# Patient Record
Sex: FEMALE | Race: WHITE | NOT HISPANIC OR LATINO | Employment: UNEMPLOYED | URBAN - METROPOLITAN AREA
[De-identification: names, ages, dates, MRNs, and addresses within clinical notes are randomized per-mention and may not be internally consistent; named-entity substitution may affect disease eponyms.]

---

## 2022-04-26 NOTE — PROGRESS NOTES
"History of Present Illness - Cristina Moya is a very pleasant 56 year old female here to see me for the first time for chronic ear drainage.    She tells me that since childhood she had chronic LEFT ear drainage.  She was followed by ENT in [place], and was treated recurrently with drops and antibiotics.      She eventually moved to [place] later in childhood, and was seen by an ENT there.  She had a few surgeries, and had a perforation.  Eventually she had \"reconstructive ear surgery.\"  THings were fine for many years.  But then about 6 years ago she started noticing that her hearing was getting worse, and eventually was fitted with hearing aids. She was followed long term by Dr Thompson.    She then started seeing Dr Bravo, because after a long time she started to have drainage from the LEFT ear again.    She brings in an audiogram from 4/2/22 that shows bilateral mild to moderate hearing loss that is mixed in natureabout 20db wrose in the RIGHT in the high mid frequencies.    Past Medical History - There is no problem list on file for this patient.      Current Medications - No current outpatient medications on file.    Allergies - No Known Allergies    Social History -   Social History     Socioeconomic History     Marital status:        Family History - No family history on file.    Review of Systems - As per HPI and PMHx, otherwise 10+ system review of the head and neck, and general constitution is negative.    Physical Exam  /87 (BP Location: Right arm, Patient Position: Sitting, Cuff Size: Adult Regular)   Pulse 69   Wt 65.8 kg (145 lb)   SpO2 98%     General - The patient is well nourished and well developed, and appears to have good nutritional status.  Alert and oriented to person and place, answers questions and cooperates with examination appropriately.   Head and Face - Normocephalic and atraumatic, with no gross asymmetry noted of the contour of the facial features.  The " facial nerve is intact, with strong symmetric movements.  Voice and Breathing - The patient was breathing comfortably without the use of accessory muscles. There was no wheezing, stridor, or stertor.  The patients voice was clear and strong, and had appropriate pitch and quality.  Ears - The RIGHT ear tympanic membrane is retracted with contact on what looks like the capitlum of the stapes.  There is no retraction pocket and no effusion.  The LEFT canal is moist, and there is a 50% perforation of the LEFT tympanic membrane. The middle ear, tympanic membrane, are all edematous.  There is alesha purulent light yellow effusion in the middle ear. I swabbed this for culture.  Eyes - Extraocular movements intact, and the pupils were reactive to light.  Sclera were not icteric or injected, conjunctiva were pink and moist.      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  She has opted to wait and just use hearing aids for now, but the priority is to clear the infection in the LEFT    I have wirten for 14 days of twice daily ciprodex, but will also call her with culture results.    The goal is to clear the infection, but in the end we might have no choice but put her on long term ear drops.

## 2022-04-29 ENCOUNTER — OFFICE VISIT (OUTPATIENT)
Dept: OTOLARYNGOLOGY | Facility: CLINIC | Age: 57
End: 2022-04-29
Payer: COMMERCIAL

## 2022-04-29 VITALS
SYSTOLIC BLOOD PRESSURE: 135 MMHG | HEART RATE: 69 BPM | WEIGHT: 145 LBS | DIASTOLIC BLOOD PRESSURE: 87 MMHG | OXYGEN SATURATION: 98 %

## 2022-04-29 DIAGNOSIS — H92.12 OTORRHEA, LEFT: Primary | ICD-10-CM

## 2022-04-29 DIAGNOSIS — H74.392: ICD-10-CM

## 2022-04-29 DIAGNOSIS — Z90.89 HISTORY OF MASTOIDECTOMY: ICD-10-CM

## 2022-04-29 PROCEDURE — 99203 OFFICE O/P NEW LOW 30 MIN: CPT | Performed by: OTOLARYNGOLOGY

## 2022-04-29 PROCEDURE — 87075 CULTR BACTERIA EXCEPT BLOOD: CPT | Performed by: OTOLARYNGOLOGY

## 2022-04-29 PROCEDURE — 87186 SC STD MICRODIL/AGAR DIL: CPT | Performed by: OTOLARYNGOLOGY

## 2022-04-29 PROCEDURE — 87070 CULTURE OTHR SPECIMN AEROBIC: CPT | Performed by: OTOLARYNGOLOGY

## 2022-04-29 PROCEDURE — 87077 CULTURE AEROBIC IDENTIFY: CPT | Performed by: OTOLARYNGOLOGY

## 2022-04-29 RX ORDER — CIPROFLOXACIN AND DEXAMETHASONE 3; 1 MG/ML; MG/ML
4 SUSPENSION/ DROPS AURICULAR (OTIC) 2 TIMES DAILY
Qty: 5.6 ML | Refills: 1 | Status: SHIPPED | OUTPATIENT
Start: 2022-04-29 | End: 2022-05-13

## 2022-04-29 NOTE — NURSING NOTE
Chief Complaint   Patient presents with     Ear Problem     Drainage and hearing- left ear       Vitals:    04/29/22 0807   BP: 135/87   BP Location: Right arm   Patient Position: Sitting   Cuff Size: Adult Regular   Pulse: 69   SpO2: 98%   Weight: 65.8 kg (145 lb)     Wt Readings from Last 1 Encounters:   04/29/22 65.8 kg (145 lb)     Ht Readings from Last 1 Encounters:   No data found for Ht       Nilda Adamson [facility], 4/29/2022 8:07 AM

## 2022-04-29 NOTE — LETTER
"    4/29/2022         RE: Cristina Moya  [address]  [facility] [address]        Dear Colleague,    Thank you for referring your patient, Cristina Moya, to the [facility]. Please see a copy of my visit note below.    History of Present Illness - Cristina Moya is a very pleasant 56 year old female here to see me for the first time for chronic ear drainage.    She tells me that since childhood she had chronic LEFT ear drainage.  She was followed by ENT in [place], and was treated recurrently with drops and antibiotics.      She eventually moved to [place] later in childhood, and was seen by an ENT there.  She had a few surgeries, and had a perforation.  Eventually she had \"reconstructive ear surgery.\"  THings were fine for many years.  But then about 6 years ago she started noticing that her hearing was getting worse, and eventually was fitted with hearing aids. She was followed long term by Dr Thompson.    She then started seeing Dr Bravo, because after a long time she started to have drainage from the LEFT ear again.    She brings in an audiogram from 4/2/22 that shows bilateral mild to moderate hearing loss that is mixed in natureabout 20db wrose in the RIGHT in the high mid frequencies.    Past Medical History - There is no problem list on file for this patient.      Current Medications - No current outpatient medications on file.    Allergies - No Known Allergies    Social History -   Social History     Socioeconomic History     Marital status:        Family History - No family history on file.    Review of Systems - As per HPI and PMHx, otherwise 10+ system review of the head and neck, and general constitution is negative.    Physical Exam  /87 (BP Location: Right arm, Patient Position: Sitting, Cuff Size: Adult Regular)   Pulse 69   Wt 65.8 kg (145 lb)   SpO2 98%     General - The patient is well nourished and well developed, and appears to have " good nutritional status.  Alert and oriented to person and place, answers questions and cooperates with examination appropriately.   Head and Face - Normocephalic and atraumatic, with no gross asymmetry noted of the contour of the facial features.  The facial nerve is intact, with strong symmetric movements.  Voice and Breathing - The patient was breathing comfortably without the use of accessory muscles. There was no wheezing, stridor, or stertor.  The patients voice was clear and strong, and had appropriate pitch and quality.  Ears - The RIGHT ear tympanic membrane is retracted with contact on what looks like the capitlum of the stapes.  There is no retraction pocket and no effusion.  The LEFT canal is moist, and there is a 50% perforation of the LEFT tympanic membrane. The middle ear, tympanic membrane, are all edematous.  There is alesha purulent light yellow effusion in the middle ear. I swabbed this for culture.  Eyes - Extraocular movements intact, and the pupils were reactive to light.  Sclera were not icteric or injected, conjunctiva were pink and moist.      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  She has opted to wait and just use hearing aids for now, but the priority is to clear the infection in the LEFT    I have wirten for 14 days of twice daily ciprodex, but will also call her with culture results.    The goal is to clear the infection, but in the end we might have no choice but put her on long term ear drops.      Again, thank you for allowing me to participate in the care of your patient.        Sincerely,        Don Dukes MD

## 2022-05-02 LAB — BACTERIA FLD CULT: NORMAL

## 2022-05-03 DIAGNOSIS — Z90.89 HISTORY OF MASTOIDECTOMY: ICD-10-CM

## 2022-05-03 DIAGNOSIS — H92.12 OTORRHEA, LEFT: Primary | ICD-10-CM

## 2022-05-03 LAB
BACTERIA SPEC CULT: ABNORMAL
BACTERIA SPEC CULT: ABNORMAL

## 2022-05-03 RX ORDER — CLINDAMYCIN HCL 300 MG
300 CAPSULE ORAL 3 TIMES DAILY
Qty: 30 CAPSULE | Refills: 1 | Status: SHIPPED | OUTPATIENT
Start: 2022-05-03 | End: 2022-05-13

## 2022-05-03 NOTE — PROGRESS NOTES
Called and left a detialed voicemail regarding results of the culture of the infected fluid from the LEFT ear.  I will add 10 days of oral clinda to her ear drops for maximum coverage.  Please let me know how things are going

## 2022-06-22 NOTE — PROGRESS NOTES
"History of Present Illness - Cristina Moya is a very pleasant 56 year old female here to see me in follow up from 4/29/2022 and was seen for the first time for chronic ear drainage.    To review her presenting history, since childhood she had chronic LEFT ear drainage.  She was followed by ENT in [place], and was treated recurrently with drops and antibiotics.      She eventually moved to [place] later in childhood, and was seen by an ENT there.  She had a few surgeries, and had a perforation.  Eventually she had \"reconstructive ear surgery.\"  Things were fine for many years.  But then about 6 years ago she started noticing that her hearing was getting worse, and eventually was fitted with hearing aids. She was followed long term by Dr Thompson.    She then started seeing Dr Bravo, because after a long time she started to have drainage from the LEFT ear again.  She brought in an audiogram from 4/2/22 that shows bilateral mild to moderate hearing loss that is mixed in nature, about 20db wrose in the RIGHT in the high mid frequencies.    On exam at the intiial visit, the RIGHT tympanic membrane was thin and monomeric and retracted, but no pockets or signs of cholesteatoma were noted.  The LEFT however had a 50% perforation and alesha purulent infection.  This was swabbed and grew out drug-sensitive staph.  I started her on ciprodex as well as oral Clinda and she is here for follow up.    Past Medical History - There is no problem list on file for this patient.      Current Medications - No current outpatient medications on file.    Allergies - No Known Allergies    Social History -   Social History     Socioeconomic History     Marital status:        Family History - No family history on file.    Review of Systems - As per HPI and PMHx, otherwise 10+ system review of the head and neck, and general constitution is negative.    Physical Exam  Wt 65.8 kg (145 lb)     General - The patient is well " nourished and well developed, and appears to have good nutritional status.  Alert and oriented to person and place, answers questions and cooperates with examination appropriately.   Head and Face - Normocephalic and atraumatic, with no gross asymmetry noted of the contour of the facial features.  The facial nerve is intact, with strong symmetric movements.  Voice and Breathing - The patient was breathing comfortably without the use of accessory muscles. There was no wheezing, stridor, or stertor.  The patients voice was clear and strong, and had appropriate pitch and quality.  Ears - The RIGHT ear is unchanged with the tympanic membrane retracted with contact on what looks like the capitlum of the stapes.  There is no retraction pocket and no effusion.  The LEFT canal is now totally clear, had the 50% perforation is unchanged.        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    We had a long discussion about use of long term antibiotic ear drops.    Since this is a new issue, we will wait on the ear drops unless things become recurrent.

## 2022-06-27 ENCOUNTER — OFFICE VISIT (OUTPATIENT)
Dept: OTOLARYNGOLOGY | Facility: CLINIC | Age: 57
End: 2022-06-27
Payer: COMMERCIAL

## 2022-06-27 VITALS — WEIGHT: 145 LBS

## 2022-06-27 DIAGNOSIS — H72.92 PERFORATION OF TYMPANIC MEMBRANE, LEFT: ICD-10-CM

## 2022-06-27 DIAGNOSIS — Z90.89 HISTORY OF MASTOIDECTOMY: Primary | ICD-10-CM

## 2022-06-27 DIAGNOSIS — H74.392: ICD-10-CM

## 2022-06-27 PROCEDURE — 99213 OFFICE O/P EST LOW 20 MIN: CPT | Performed by: OTOLARYNGOLOGY

## 2022-06-27 NOTE — NURSING NOTE
Chief Complaint   Patient presents with     RECHECK     ear       Vitals:    06/27/22 0910   Weight: 65.8 kg (145 lb)     Wt Readings from Last 1 Encounters:   06/27/22 65.8 kg (145 lb)     Ht Readings from Last 1 Encounters:   No data found for Ht       Nilda Adamson [facility], 6/27/2022 9:11 AM

## 2022-06-27 NOTE — LETTER
"    6/27/2022         RE: Cristina Moya  [address]  [facility] [address]        Dear Colleague,    Thank you for referring your patient, Cristina Moya, to the [facility]. Please see a copy of my visit note below.    History of Present Illness - Cristina Moya is a very pleasant 56 year old female here to see me in follow up from 4/29/2022 and was seen for the first time for chronic ear drainage.    To review her presenting history, since childhood she had chronic LEFT ear drainage.  She was followed by ENT in [place], and was treated recurrently with drops and antibiotics.      She eventually moved to [place] later in childhood, and was seen by an ENT there.  She had a few surgeries, and had a perforation.  Eventually she had \"reconstructive ear surgery.\"  Things were fine for many years.  But then about 6 years ago she started noticing that her hearing was getting worse, and eventually was fitted with hearing aids. She was followed long term by Dr Thompson.    She then started seeing Dr Bravo, because after a long time she started to have drainage from the LEFT ear again.  She brought in an audiogram from 4/2/22 that shows bilateral mild to moderate hearing loss that is mixed in nature, about 20db wrose in the RIGHT in the high mid frequencies.    On exam at the intiial visit, the RIGHT tympanic membrane was thin and monomeric and retracted, but no pockets or signs of cholesteatoma were noted.  The LEFT however had a 50% perforation and alesha purulent infection.  This was swabbed and grew out drug-sensitive staph.  I started her on ciprodex as well as oral Clinda and she is here for follow up.    Past Medical History - There is no problem list on file for this patient.      Current Medications - No current outpatient medications on file.    Allergies - No Known Allergies    Social History -   Social History     Socioeconomic History     Marital status:  "       Family History - No family history on file.    Review of Systems - As per HPI and PMHx, otherwise 10+ system review of the head and neck, and general constitution is negative.    Physical Exam  Wt 65.8 kg (145 lb)     General - The patient is well nourished and well developed, and appears to have good nutritional status.  Alert and oriented to person and place, answers questions and cooperates with examination appropriately.   Head and Face - Normocephalic and atraumatic, with no gross asymmetry noted of the contour of the facial features.  The facial nerve is intact, with strong symmetric movements.  Voice and Breathing - The patient was breathing comfortably without the use of accessory muscles. There was no wheezing, stridor, or stertor.  The patients voice was clear and strong, and had appropriate pitch and quality.  Ears - The RIGHT ear is unchanged with the tympanic membrane retracted with contact on what looks like the capitlum of the stapes.  There is no retraction pocket and no effusion.  The LEFT canal is now totally clear, had the 50% perforation is unchanged.        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    We had a long discussion about use of long term antibiotic ear drops.    Since this is a new issue, we will wait on the ear drops unless things become recurrent.      Again, thank you for allowing me to participate in the care of your patient.        Sincerely,        Don Dukes MD